# Patient Record
Sex: FEMALE | Race: WHITE | NOT HISPANIC OR LATINO | URBAN - METROPOLITAN AREA
[De-identification: names, ages, dates, MRNs, and addresses within clinical notes are randomized per-mention and may not be internally consistent; named-entity substitution may affect disease eponyms.]

---

## 2018-05-25 ENCOUNTER — INPATIENT (INPATIENT)
Facility: HOSPITAL | Age: 68
LOS: 0 days | Discharge: HOME | DRG: 921 | End: 2018-05-26
Attending: SURGERY | Admitting: SURGERY
Payer: COMMERCIAL

## 2018-05-25 VITALS
SYSTOLIC BLOOD PRESSURE: 146 MMHG | DIASTOLIC BLOOD PRESSURE: 75 MMHG | HEART RATE: 88 BPM | OXYGEN SATURATION: 97 % | RESPIRATION RATE: 17 BRPM | TEMPERATURE: 98 F

## 2018-05-25 LAB
ALBUMIN SERPL ELPH-MCNC: 3.8 G/DL — SIGNIFICANT CHANGE UP (ref 3.3–5)
ALP SERPL-CCNC: 30 U/L — LOW (ref 40–120)
ALT FLD-CCNC: 13 U/L — SIGNIFICANT CHANGE UP (ref 10–45)
ANION GAP SERPL CALC-SCNC: 14 MMOL/L — SIGNIFICANT CHANGE UP (ref 5–17)
APTT BLD: 31.3 SEC — SIGNIFICANT CHANGE UP (ref 27.5–37.4)
AST SERPL-CCNC: 17 U/L — SIGNIFICANT CHANGE UP (ref 10–40)
BASOPHILS NFR BLD AUTO: 0.5 % — SIGNIFICANT CHANGE UP (ref 0–2)
BILIRUB SERPL-MCNC: 0.3 MG/DL — SIGNIFICANT CHANGE UP (ref 0.2–1.2)
BLD GP AB SCN SERPL QL: NEGATIVE — SIGNIFICANT CHANGE UP
BUN SERPL-MCNC: 13 MG/DL — SIGNIFICANT CHANGE UP (ref 7–23)
CALCIUM SERPL-MCNC: 9.3 MG/DL — SIGNIFICANT CHANGE UP (ref 8.4–10.5)
CHLORIDE SERPL-SCNC: 97 MMOL/L — SIGNIFICANT CHANGE UP (ref 96–108)
CO2 SERPL-SCNC: 24 MMOL/L — SIGNIFICANT CHANGE UP (ref 22–31)
CREAT SERPL-MCNC: 0.74 MG/DL — SIGNIFICANT CHANGE UP (ref 0.5–1.3)
EOSINOPHIL NFR BLD AUTO: 1.5 % — SIGNIFICANT CHANGE UP (ref 0–6)
GLUCOSE SERPL-MCNC: 90 MG/DL — SIGNIFICANT CHANGE UP (ref 70–99)
HCT VFR BLD CALC: 41.3 % — SIGNIFICANT CHANGE UP (ref 34.5–45)
HGB BLD-MCNC: 14 G/DL — SIGNIFICANT CHANGE UP (ref 11.5–15.5)
INR BLD: 1.07 — SIGNIFICANT CHANGE UP (ref 0.88–1.16)
LYMPHOCYTES # BLD AUTO: 23.1 % — SIGNIFICANT CHANGE UP (ref 13–44)
MCHC RBC-ENTMCNC: 30.4 PG — SIGNIFICANT CHANGE UP (ref 27–34)
MCHC RBC-ENTMCNC: 33.9 G/DL — SIGNIFICANT CHANGE UP (ref 32–36)
MCV RBC AUTO: 89.6 FL — SIGNIFICANT CHANGE UP (ref 80–100)
MONOCYTES NFR BLD AUTO: 10.1 % — SIGNIFICANT CHANGE UP (ref 2–14)
NEUTROPHILS NFR BLD AUTO: 64.8 % — SIGNIFICANT CHANGE UP (ref 43–77)
PLATELET # BLD AUTO: 189 K/UL — SIGNIFICANT CHANGE UP (ref 150–400)
POTASSIUM SERPL-MCNC: 3.4 MMOL/L — LOW (ref 3.5–5.3)
POTASSIUM SERPL-SCNC: 3.4 MMOL/L — LOW (ref 3.5–5.3)
PROT SERPL-MCNC: 6.8 G/DL — SIGNIFICANT CHANGE UP (ref 6–8.3)
PROTHROM AB SERPL-ACNC: 11.9 SEC — SIGNIFICANT CHANGE UP (ref 9.8–12.7)
RBC # BLD: 4.61 M/UL — SIGNIFICANT CHANGE UP (ref 3.8–5.2)
RBC # FLD: 12.5 % — SIGNIFICANT CHANGE UP (ref 10.3–16.9)
RH IG SCN BLD-IMP: POSITIVE — SIGNIFICANT CHANGE UP
SODIUM SERPL-SCNC: 135 MMOL/L — SIGNIFICANT CHANGE UP (ref 135–145)
WBC # BLD: 8 K/UL — SIGNIFICANT CHANGE UP (ref 3.8–10.5)
WBC # FLD AUTO: 8 K/UL — SIGNIFICANT CHANGE UP (ref 3.8–10.5)

## 2018-05-25 PROCEDURE — 99285 EMERGENCY DEPT VISIT HI MDM: CPT

## 2018-05-25 RX ORDER — ACETAMINOPHEN 500 MG
650 TABLET ORAL EVERY 6 HOURS
Qty: 0 | Refills: 0 | Status: DISCONTINUED | OUTPATIENT
Start: 2018-05-25 | End: 2018-05-26

## 2018-05-25 RX ORDER — LABETALOL HCL 100 MG
5 TABLET ORAL
Qty: 0 | Refills: 0 | Status: DISCONTINUED | OUTPATIENT
Start: 2018-05-25 | End: 2018-05-26

## 2018-05-25 RX ORDER — DIAZEPAM 5 MG
5 TABLET ORAL EVERY 6 HOURS
Qty: 0 | Refills: 0 | Status: DISCONTINUED | OUTPATIENT
Start: 2018-05-25 | End: 2018-05-26

## 2018-05-25 RX ORDER — OXYCODONE HYDROCHLORIDE 5 MG/1
5 TABLET ORAL EVERY 6 HOURS
Qty: 0 | Refills: 0 | Status: DISCONTINUED | OUTPATIENT
Start: 2018-05-25 | End: 2018-05-26

## 2018-05-25 RX ADMIN — Medication 1 MILLIGRAM(S): at 22:50

## 2018-05-25 NOTE — ED PROVIDER NOTE - PROGRESS NOTE DETAILS
d/w plastic team, will attempt to evacuate hematoma, if not able to, will take to OR pt feels anxious, per plastic team, requesting ativan for anxiolysis 2mg pt reevaluated after ativan, no change in mental status or sensorium, sitting upright, no pooling of secretion per primary team, rec'd further monitoring or evaluation, will admit to plastic service

## 2018-05-25 NOTE — ED PROVIDER NOTE - PHYSICAL EXAMINATION
CON: ao x 3, HENMT: clear oropharynx, facial compress noted, full ROM of neck, plastic team at bedside for evaluation, HEAD: atraumatic, CV: rrr, RESP: no tachypnea, SKIN: R facial hematoma, no bleeding on exam (though pt w/ pressure dressing during eval), MSK: no deformities, NEURO: no gross motor or sensory deficit

## 2018-05-25 NOTE — ED ADULT TRIAGE NOTE - ARRIVAL INFO ADDITIONAL COMMENTS
Pt BIBA her home nurse and surgeon for post op complication, She had a hematoma noted on the right cheek 2 hours ago with a pressure dressing on. Pt has facelift done 2 days ago. Denies fever, chills, HA, CP, N+V+D. Pt states she does not take blood thinners. HX of hypothyroidism and HTN. She takes norvasc at 10 pm and valium 5 mg at 9. She last took pain medication tylenol 12 hours ago.

## 2018-05-25 NOTE — ED PROVIDER NOTE - OBJECTIVE STATEMENT
68 yof pw postop complication w/ facial hematoma sp facelift at surgeon's office.  no cp, no sob, no drooling, no nv 68 yof pw postop complication w/ facial hematoma sp facelift at surgeon's office.  no cp, no sob, no drooling, no nv.  hx of HTN.

## 2018-05-25 NOTE — ED ADULT NURSE NOTE - OBJECTIVE STATEMENT
pt received in [facility] A & O x 3 pt had plastic surgery done 2 days ago ,pt started bleeding from surgical site ( right side of face ) surgeons at bedside  trying stop the hemorrhage

## 2018-05-25 NOTE — CONSULT NOTE ADULT - SUBJECTIVE AND OBJECTIVE BOX
68 year old woman sent to ED for hematoma of right neck and cheek approximately 3 days after facelift in plastic surgeon's office. Upon presentation, calm, alert and oriented, vocalizing, no respiratory/airway compromise.    Pertinent history: History of well controlled hypertension.    Exam: Incisions intact, facelift flaps soft, warm, well perfused, hematoma present in lower R face/neck. no tracheal deviation.     Bedside procedure: Attending plastic surgeon, Dr. Arron Meredith was at bedside. Patient was premedicated with 2mg ativan and 5mg labetalol for blood pressure control and anxiolytic effect. Patient right face was sterilely prepped; release of right preauricular suture line, irrigation with saline and evacuation of ~10-20cc of hematoma with suction was performed. Incision closed over penrose drain. Cold compress and ACE bandage was applied. Patient tolerated the procedure well. There were no complications. 68 year old woman sent to ED for hematoma of right neck and cheek approximately 3 days after facelift in plastic surgeon's office. Upon presentation, calm, alert and oriented, vocalizing, no respiratory/airway compromise.    Pertinent history: History of well controlled hypertension.    Exam: Incisions intact, facelift flaps soft, warm, well perfused, hematoma present in lower R face/neck. no tracheal deviation.     Bedside procedure: Attending plastic surgeons, Dr. Morenita Perales and Dr. Arron Meredith was at bedside. Patient was premedicated with 2mg ativan and 5mg labetalol for blood pressure control and anxiolytic effect. Patient right face was sterilely prepped; release of right preauricular suture line, irrigation with saline and evacuation of ~10-20cc of hematoma with suction was performed. Incision closed over penrose drain. Cold compress and ACE bandage was applied. Patient tolerated the procedure well. There were no complications.

## 2018-05-26 VITALS
RESPIRATION RATE: 16 BRPM | TEMPERATURE: 98 F | HEART RATE: 75 BPM | DIASTOLIC BLOOD PRESSURE: 53 MMHG | SYSTOLIC BLOOD PRESSURE: 113 MMHG | OXYGEN SATURATION: 98 %

## 2018-05-26 PROCEDURE — 85730 THROMBOPLASTIN TIME PARTIAL: CPT

## 2018-05-26 PROCEDURE — 85610 PROTHROMBIN TIME: CPT

## 2018-05-26 PROCEDURE — 86900 BLOOD TYPING SEROLOGIC ABO: CPT

## 2018-05-26 PROCEDURE — 86901 BLOOD TYPING SEROLOGIC RH(D): CPT

## 2018-05-26 PROCEDURE — 86850 RBC ANTIBODY SCREEN: CPT

## 2018-05-26 PROCEDURE — 80053 COMPREHEN METABOLIC PANEL: CPT

## 2018-05-26 PROCEDURE — 36415 COLL VENOUS BLD VENIPUNCTURE: CPT

## 2018-05-26 PROCEDURE — 99285 EMERGENCY DEPT VISIT HI MDM: CPT | Mod: 25

## 2018-05-26 PROCEDURE — 85025 COMPLETE CBC W/AUTO DIFF WBC: CPT

## 2018-05-26 PROCEDURE — 93005 ELECTROCARDIOGRAM TRACING: CPT

## 2018-05-26 PROCEDURE — 96374 THER/PROPH/DIAG INJ IV PUSH: CPT

## 2018-05-26 RX ORDER — HYDROMORPHONE HYDROCHLORIDE 2 MG/ML
0.5 INJECTION INTRAMUSCULAR; INTRAVENOUS; SUBCUTANEOUS
Qty: 0 | Refills: 0 | Status: DISCONTINUED | OUTPATIENT
Start: 2018-05-26 | End: 2018-05-26

## 2018-05-26 RX ORDER — AMLODIPINE BESYLATE 2.5 MG/1
1 TABLET ORAL
Qty: 0 | Refills: 0 | COMMUNITY

## 2018-05-26 RX ORDER — ONDANSETRON 8 MG/1
4 TABLET, FILM COATED ORAL EVERY 6 HOURS
Qty: 0 | Refills: 0 | Status: DISCONTINUED | OUTPATIENT
Start: 2018-05-26 | End: 2018-05-26

## 2018-05-26 RX ADMIN — Medication 100 MILLIGRAM(S): at 06:35

## 2018-05-26 RX ADMIN — Medication 100 MILLIGRAM(S): at 00:13

## 2018-05-26 RX ADMIN — Medication 5 MILLIGRAM(S): at 00:01

## 2018-05-26 RX ADMIN — ONDANSETRON 4 MILLIGRAM(S): 8 TABLET, FILM COATED ORAL at 02:58

## 2018-05-26 RX ADMIN — HYDROMORPHONE HYDROCHLORIDE 0.5 MILLIGRAM(S): 2 INJECTION INTRAMUSCULAR; INTRAVENOUS; SUBCUTANEOUS at 03:03

## 2018-05-26 RX ADMIN — Medication 5 MILLIGRAM(S): at 00:26

## 2018-05-26 RX ADMIN — HYDROMORPHONE HYDROCHLORIDE 0.5 MILLIGRAM(S): 2 INJECTION INTRAMUSCULAR; INTRAVENOUS; SUBCUTANEOUS at 03:16

## 2018-05-26 NOTE — DISCHARGE NOTE ADULT - CARE PROVIDER_API CALL
Alta Hyatt), Plastic Surgery; Surgery  [address] [place] [address]  Phone: [phone]  Fax: [phone]

## 2018-05-26 NOTE — DISCHARGE NOTE ADULT - CARE PLAN
Principal Discharge DX:	Post-operative complication  Goal:	Recovery  Assessment and plan of treatment:	Please take Norvasc as prescribed for blood pressure control  Please follow up with Dr. Hyatt as scheduled in office. Call office for appointment if you do not have one.  Please follow all instructions from Dr. Hyatt's office for post-operative recovery.  Avoid strenuous activity and straining.   Continue to wear compression dressing until instructed to remove by Dr. Hyatt's office.

## 2018-05-26 NOTE — DISCHARGE NOTE ADULT - HOSPITAL COURSE
Patient presented to ED on POD2 s/p facelift (office procedure). She was found to have a R sided hematoma. The R facelift incision was opened, irrigated, and hematoma was evacuated at the bedside in the ED. Patient tolerated procedure well. No complications. She was kept for observation overnight, without further issue. She was comfortable, awake, alert on the following morning. She was discharged with her private duty nurse.

## 2018-05-26 NOTE — DISCHARGE NOTE ADULT - MEDICATION SUMMARY - MEDICATIONS TO TAKE
I will START or STAY ON the medications listed below when I get home from the hospital:    Norvasc 5 mg oral tablet  -- 1 tab(s) by mouth once a day  -- Indication: For Hypertension

## 2018-05-26 NOTE — DISCHARGE NOTE ADULT - PATIENT PORTAL LINK FT
You can access the [facility] Patient Portal, offered by [facility], by registering with the following website: [email or web address]

## 2018-05-26 NOTE — PROGRESS NOTE ADULT - SUBJECTIVE AND OBJECTIVE BOX
S: 68 year old woman day 1 s/p evacuation of right facelift hematoma. Overall doing very well this am with no evidence of re-accumulation. Her BP is well-controlled on labetolol, norvasc and valium and she has no pain. Denies chest pain, lightheadedness or any other symptoms other than mild weakness.    O:  Vital Signs Last 24 Hrs  T(C): 36.6 (25 May 2018 22:38), Max: 36.6 (25 May 2018 22:38)  T(F): 97.9 (25 May 2018 22:38), Max: 97.9 (25 May 2018 22:38)  HR: 79 (26 May 2018 03:03) (77 - 88)  BP: 115/60 (26 May 2018 03:03) (115/60 - 146/75)  BP(mean): --  ABP: --  ABP(mean): --  RR: 16 (26 May 2018 03:03) (16 - 17)  SpO2: 98% (26 May 2018 03:03) (97% - 98%)    MEDICATIONS  (STANDING):  clindamycin IVPB 300 milliGRAM(s) IV Intermittent every 8 hours    MEDICATIONS  (PRN):  acetaminophen   Tablet. 650 milliGRAM(s) Oral every 6 hours PRN Mild Pain (1 - 3)  diazepam    Tablet 5 milliGRAM(s) Oral every 6 hours PRN anxiety, agitation  HYDROmorphone  Injectable 0.5 milliGRAM(s) IV Push every 3 hours PRN Severe Pain (7 - 10)  labetalol Injectable 5 milliGRAM(s) IV Push every 3 hours PRN Systolic blood pressure > 130  ondansetron Injectable 4 milliGRAM(s) IV Push every 6 hours PRN Nausea and/or Vomiting  oxyCODONE    IR 5 milliGRAM(s) Oral every 6 hours PRN Moderate Pain (4 - 6)      Exam: Incisions intact, right penrose drain in place. Facelift flaps soft, warm, well-perfused, no fluid re-accumulation, no hematoma present. CN II-XII grossly wnl.      A/P: 68 year old F day 1 s/p R sided facelift hematoma evacuation.    Plan:  - Penrose drain removed at bedside.  - 300mg IV Clindamycin for perioperative prophylaxis.  - Continue compression dressing.  - Ok to d/c back to [facility] with private duty nurse today.  - Continue levaquin per primary surgeon postop regimen.  - Strict BP control. [facility].

## 2018-05-26 NOTE — DISCHARGE NOTE ADULT - PLAN OF CARE
Recovery Please take Norvasc as prescribed for blood pressure control  Please follow up with Dr. Hyatt as scheduled in office. Call office for appointment if you do not have one.  Please follow all instructions from Dr. Hyatt's office for post-operative recovery.  Avoid strenuous activity and straining.   Continue to wear compression dressing until instructed to remove by Dr. Hyatt's office.

## 2018-05-31 DIAGNOSIS — L76.32 POSTPROCEDURAL HEMATOMA OF SKIN AND SUBCUTANEOUS TISSUE FOLLOWING OTHER PROCEDURE: ICD-10-CM
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
## 2018-05-31 DIAGNOSIS — I10 ESSENTIAL (PRIMARY) HYPERTENSION: ICD-10-CM
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
## 2024-06-12 NOTE — CONSULT NOTE ADULT - PROVIDER SPECIALTY LIST ADULT
Labs are healthy and normal   -continue healthy diet and lifestyle     Kidney function, liver function, ferritin, iron, thyroid, blood counts within normal limits    Continue with ultrasound as ordered Plastic Surgery
